# Patient Record
(demographics unavailable — no encounter records)

---

## 2025-03-01 NOTE — ASSESSMENT
[FreeTextEntry1] : JAZZY NEVAREZ is a 25-year-old female with left shoulder pain. I discussed with the patient that their symptoms, signs, and imaging are most consistent with scapular dyskinesis and myofascial pain. We reviewed the natural history of this condition and treatment options. We agreed on the following plan:    XR taken and reviewed with the patient today. Activity modification:  Start Home Exercises for scapular stabilization. Demonstration and handout provided. Start physical therapy. Referral provided. Discussed ergonomics of workspace and posture during studying. Advanced imaging: Consider MRI if no symptomatic improvement. Follow up in 6-8 weeks.

## 2025-03-01 NOTE — ADDENDUM
[FreeTextEntry1] : I, Verona Gilliam ([facility]) assisted in filling out this chart under the dictation of Kelsi Gresham on 02/25/2025 .

## 2025-03-01 NOTE — HISTORY OF PRESENT ILLNESS
[de-identified] : JAZZY NEVAREZ is a 25-year-old female with left shoulder pain. Pain started 3-4 years ago without any injuries. Pt. plays piano. The pain is associated with activities such as studying, playing piano, and dancing. The patient notes that the pain worsens with movement and improves with rest and proper posture. She did 1 session of PT but reports increased pain with movement. She states moving the arm makes the pain worse. There is no history of injury or trauma to the shoulder. RHD. The patient denies any numbness or tingling down the arm. The patient reports a popping sound in the shoulder. The patient has not taken any medication for the pain. She denies any numbness and confirms clicking. 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 She engages in activities such as dancing and running.

## 2025-03-01 NOTE — HISTORY OF PRESENT ILLNESS
[de-identified] : JAZZY NEVAREZ is a 25-year-old female with left shoulder pain. Pain started 3-4 years ago without any injuries. Pt. plays piano. The pain is associated with activities such as studying, playing piano, and dancing. The patient notes that the pain worsens with movement and improves with rest and proper posture. She did 1 session of PT but reports increased pain with movement. She states moving the arm makes the pain worse. There is no history of injury or trauma to the shoulder. RHD. The patient denies any numbness or tingling down the arm. The patient reports a popping sound in the shoulder. The patient has not taken any medication for the pain. She denies any numbness and confirms clicking. 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 She engages in activities such as dancing and running.

## 2025-03-01 NOTE — END OF VISIT
[FreeTextEntry3] : Documented by Verona Gilliam acting as a scribe for Kelsi Gresham on 02/25/2025   All medical record entries made by the Scribe were at my, Dr. Kelsi Gresham direction and personally dictated by me on 02/25/2025 . I have reviewed the chart and agree that the record accurately reflects my personal performance of the history, physical exam, assessment and plan. I have also personally directed, reviewed, and agreed with the chart. [Time Spent: ___ minutes] : I have spent [unfilled] minutes of time on the encounter which excludes teaching and separately reported services.

## 2025-03-01 NOTE — PHYSICAL EXAM
[de-identified] : General: Well-nourished, well-developed, alert, and in no acute distress. Head: Normocephalic. Eyes: Pupils equal, extraocular muscles intact, normal sclera. Nose: No nasal discharge. Cardiovascular: Extremities are warm and well perfused. Respiratory: No labored breathing. Extremities: Sensation is intact distally bilaterally.  Lymphatic: No regional lymphadenopathy, no lymphedema Neurologic: No focal deficits Skin: Normal skin color, texture, and turgor Psychiatric: Normal affect  MSK:  Beighton score 4  C-spine demonstrates no tenderness to palpation midline, paraspinals. AROM: full and pain-free Cervical facet loading: negative Spurling's Compression: negative   Examination of left shoulder shows no overlying skin changes or muscular atrophy. There is no tenderness to palpation over the AC joint, Bicipital groove, Trapezius, Rhomboids Range of motion shows forward elevation of 180, abduction 180, external rotation 60, and internal rotation to the mid thoracic spine. There is no pain at the end range of motion.   Special Tests: Neer's negative Hawkin's negative Judith's negative Resisted ext rotation negative Lift off negative [place] negative Speed's negative Apprehension test negative No scapular winging.   Examination of left shoulder shows no overlying skin changes or muscular atrophy. There is no tenderness to palpation over the AC joint, Bicipital groove, Trapezius, Rhomboids   Range of motion shows forward elevation of 180, abduction 180, external rotation 60, and internal rotation to the mid thoracic spine. There is no pain at the end range of motion.   Special Tests: Neer's negative Hawkin's negative Judith's negative Resisted ext rotation negative Lift off negative [place] negative Speed's negative Apprehension test negative No scapular winging.     Sensation is intact to light touch over the axillary, musculocutaneous, median, radial, and ulnar nerve distributions bilaterally. Capillary refill is less than two seconds. Radial pulses 2+ equal bilaterally. Strength testing shows 5/5 abduction, 5/5 external rotation, 5/5 internal rotation, 5/5 biceps, 5/5 triceps. 5/5 wrist extension, 5/5 intrinsics. Reflexes 2+ biceps, brachioradialis.    [de-identified] : Date: 02/25/2025 Location: [facility] Body part: XRAY B/L SHOULDER independently reviewed and interpreted by me. Impression: There is no evidence of fracture or dislocation. The joint spaces are preserved.

## 2025-03-01 NOTE — DISCUSSION/SUMMARY

## 2025-03-01 NOTE — DISCUSSION/SUMMARY

## 2025-03-01 NOTE — PHYSICAL EXAM
[de-identified] : General: Well-nourished, well-developed, alert, and in no acute distress. Head: Normocephalic. Eyes: Pupils equal, extraocular muscles intact, normal sclera. Nose: No nasal discharge. Cardiovascular: Extremities are warm and well perfused. Respiratory: No labored breathing. Extremities: Sensation is intact distally bilaterally.  Lymphatic: No regional lymphadenopathy, no lymphedema Neurologic: No focal deficits Skin: Normal skin color, texture, and turgor Psychiatric: Normal affect  MSK:  Beighton score 4  C-spine demonstrates no tenderness to palpation midline, paraspinals. AROM: full and pain-free Cervical facet loading: negative Spurling's Compression: negative   Examination of left shoulder shows no overlying skin changes or muscular atrophy. There is no tenderness to palpation over the AC joint, Bicipital groove, Trapezius, Rhomboids Range of motion shows forward elevation of 180, abduction 180, external rotation 60, and internal rotation to the mid thoracic spine. There is no pain at the end range of motion.   Special Tests: Neer's negative Hawkin's negative Judith's negative Resisted ext rotation negative Lift off negative [place] negative Speed's negative Apprehension test negative No scapular winging.   Examination of left shoulder shows no overlying skin changes or muscular atrophy. There is no tenderness to palpation over the AC joint, Bicipital groove, Trapezius, Rhomboids   Range of motion shows forward elevation of 180, abduction 180, external rotation 60, and internal rotation to the mid thoracic spine. There is no pain at the end range of motion.   Special Tests: Neer's negative Hawkin's negative Judith's negative Resisted ext rotation negative Lift off negative [place] negative Speed's negative Apprehension test negative No scapular winging.     Sensation is intact to light touch over the axillary, musculocutaneous, median, radial, and ulnar nerve distributions bilaterally. Capillary refill is less than two seconds. Radial pulses 2+ equal bilaterally. Strength testing shows 5/5 abduction, 5/5 external rotation, 5/5 internal rotation, 5/5 biceps, 5/5 triceps. 5/5 wrist extension, 5/5 intrinsics. Reflexes 2+ biceps, brachioradialis.    [de-identified] : Date: 02/25/2025 Location: [facility] Body part: XRAY B/L SHOULDER independently reviewed and interpreted by me. Impression: There is no evidence of fracture or dislocation. The joint spaces are preserved.

## 2025-03-01 NOTE — ADDENDUM
[FreeTextEntry1] : I, Verona Gilliam ([facility]) assisted in filling out this chart under the dictation of Kelsi Gresham on 02/25/2025 .

## 2025-04-10 NOTE — PHYSICAL EXAM
[de-identified] : General: Well-nourished, well-developed, alert, and in no acute distress.  Head: Normocephalic.  Eyes: Pupils equal, extraocular muscles intact, normal sclera.  Nose: No nasal discharge.  Cardiovascular: Extremities are warm and well-perfused. Distal pulses are symmetric bilaterally.  Respiratory: No labored breathing.  Extremities: Sensation is intact distally bilaterally. Distal pulses are symmetric bilaterally  Lymphatic: No regional lymphadenopathy, no lymphedema  Neurologic: No focal deficits  Skin: Normal skin color, texture, and turgor  Psychiatric: Normal affect

## 2025-04-10 NOTE — HISTORY OF PRESENT ILLNESS
[de-identified] : JAZZY NEVAREZ is a 25-year-old female who presents for a follow-up of left shoulder pain. Last visit was on 02/25/2025. Pt. states the pain is better. She is attending PT 2x a week and has noticed improvement in her symptoms. Initially, the pain was severe, but it has since improved. The patient uses a cable machine for exercises, which she cannot do at home. 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 She is continuing to make changes and improvements with her home exercise regimen.

## 2025-04-10 NOTE — END OF VISIT
[FreeTextEntry3] : Documented by Verona Gilliam acting as a scribe for Kelsi Gresham on 04/10/2025   All medical record entries made by the Scribe were at my, Dr. Kelsi Gresham direction and personally dictated by me on 04/10/2025 . I have reviewed the chart and agree that the record accurately reflects my personal performance of the history, physical exam, assessment and plan. I have also personally directed, reviewed, and agreed with the chart.

## 2025-04-10 NOTE — PHYSICAL EXAM
[de-identified] : General: Well-nourished, well-developed, alert, and in no acute distress.  Head: Normocephalic.  Eyes: Pupils equal, extraocular muscles intact, normal sclera.  Nose: No nasal discharge.  Cardiovascular: Extremities are warm and well-perfused. Distal pulses are symmetric bilaterally.  Respiratory: No labored breathing.  Extremities: Sensation is intact distally bilaterally. Distal pulses are symmetric bilaterally  Lymphatic: No regional lymphadenopathy, no lymphedema  Neurologic: No focal deficits  Skin: Normal skin color, texture, and turgor  Psychiatric: Normal affect

## 2025-04-10 NOTE — ADDENDUM
[FreeTextEntry1] : I, Verona Gilliam ([facility]) assisted in filling out this chart under the dictation of Kelsi Gresham on 04/10/2025 .

## 2025-04-10 NOTE — ASSESSMENT
[FreeTextEntry1] : JAZZY NEVAREZ is a 25-year-old female with left shoulder pain.    I discussed with the patient that their symptoms, signs, and imaging are most consistent with scapular dyskinesis and left shoulder strain. We reviewed the natural history of this condition and treatment options. We agreed on the following plan:   Encouraged to continue home exercises per handout. Continue physical therapy. New referral provided. Imaging: Consider MRI if symptoms worsen. Follow up in 3 months.

## 2025-04-10 NOTE — ADDENDUM
[FreeTextEntry1] : I, Verona Gilliam ([facility]) assisted in filling out this chart under the dictation of Kelsi Gresham on 04/10/2025 .

## 2025-04-10 NOTE — HISTORY OF PRESENT ILLNESS
[de-identified] : JAZZY NEVAREZ is a 25-year-old female who presents for a follow-up of left shoulder pain. Last visit was on 02/25/2025. Pt. states the pain is better. She is attending PT 2x a week and has noticed improvement in her symptoms. Initially, the pain was severe, but it has since improved. The patient uses a cable machine for exercises, which she cannot do at home. 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 She is continuing to make changes and improvements with her home exercise regimen.

## 2025-04-28 NOTE — HISTORY OF PRESENT ILLNESS
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 She feels well today. Needs Hep B titers, Hep C, MMR, Flu vaccine. Paperwork in folder.

## 2025-05-07 NOTE — HISTORY OF PRESENT ILLNESS
[Home] : at home, [unfilled] , at the time of the visit. [Medical Office: ([facility])___] : at the medical office located in  [Telehealth (audio & video)] : This visit was provided via telehealth using real-time 2-way audio visual technology. 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 Review bloodwork and Hepatitis B readings today. Paperwork in folder.    Discussed with patient: You have chosen to receive care through the use of tele-media. Tele-media enables health care providers at different locations to provide safe, effective and convenient care through the use of technology. Please note that this is a billable encounter. As with any health care service, there are risks associated with the use of tele-media, including equipment failure, poor image and/or resolution, and  issues. You understand that I cannot physically examine you and that you may need to come to the clinic to complete the assessment. Patient agreed verbally to understanding the risks and benefits of tele-media as explained. All questions regarding tele-media encounters were answered. Total time spent: [15]

## 2025-05-07 NOTE — PHYSICAL EXAM
[No Respiratory Distress] : no respiratory distress  [Normal] : no respiratory distress, lungs were clear to auscultation bilaterally and no accessory muscle use

## 2025-05-28 NOTE — HISTORY OF PRESENT ILLNESS
[FreeTextEntry1] : skin lesion [de-identified] : This is a 25F with no significant PMHx here for acute skin lesion on back, was told before that it's a lipoma.  She will return in July for CPE and to recheck HbsAB after dose.